# Patient Record
Sex: FEMALE | Race: WHITE | URBAN - METROPOLITAN AREA
[De-identification: names, ages, dates, MRNs, and addresses within clinical notes are randomized per-mention and may not be internally consistent; named-entity substitution may affect disease eponyms.]

---

## 2017-12-08 ENCOUNTER — OFFICE VISIT (OUTPATIENT)
Dept: FAMILY MEDICINE | Facility: CLINIC | Age: 32
End: 2017-12-08
Payer: COMMERCIAL

## 2017-12-08 VITALS
WEIGHT: 158 LBS | DIASTOLIC BLOOD PRESSURE: 83 MMHG | BODY MASS INDEX: 26.98 KG/M2 | OXYGEN SATURATION: 97 % | SYSTOLIC BLOOD PRESSURE: 125 MMHG | HEIGHT: 64 IN | TEMPERATURE: 98.8 F | HEART RATE: 77 BPM

## 2017-12-08 DIAGNOSIS — Z01.818 PRE-OP EXAM: Primary | ICD-10-CM

## 2017-12-08 PROCEDURE — 99202 OFFICE O/P NEW SF 15 MIN: CPT | Mod: 25 | Performed by: NURSE PRACTITIONER

## 2017-12-08 PROCEDURE — 93000 ELECTROCARDIOGRAM COMPLETE: CPT | Performed by: NURSE PRACTITIONER

## 2017-12-08 RX ORDER — INSULIN GLARGINE 100 [IU]/ML
INJECTION, SOLUTION SUBCUTANEOUS
COMMUNITY
Start: 2015-08-10

## 2017-12-08 RX ORDER — LANCETS
1 EACH MISCELLANEOUS
COMMUNITY
Start: 2015-07-20 | End: 2016-07-19

## 2017-12-08 NOTE — PROGRESS NOTES
"HPI      SUBJECTIVE:   Payal Henson is a 32 year old female who presents to clinic today for the following health issues:      Chief Complaint   Patient presents with     Other     EKG       Needs ekg for preop   Getting a breast reduction  Had preop completed at ob/gyn office   No cardiac history or family hx  No symptoms       Past Medical History:   Diagnosis Date     Depressive disorder     on medication     Diabetes (H)     type I on insulin pump     Past Surgical History:   Procedure Laterality Date      SECTION N/A 10/3/2015    Procedure:  SECTION;  Surgeon: Guanakito Mendez MD;  Location:  [facility]     COSMETIC SURGERY      birthmark on upper lip     Social History   Substance Use Topics     Smoking status: Never Smoker     Smokeless tobacco: Never Used     Alcohol use No     Current Outpatient Prescriptions   Medication Sig Dispense Refill     insulin aspart (NOVOLOG VIAL) 100 UNITS/ML injection Inject 40 Units Subcutaneous        blood glucose monitoring (VARGHESE CONTOUR NEXT) test strip 6 strips       insulin glargine (LANTUS) 100 UNIT/ML injection 38-40 units daily if pump failure  Indications: DIABETES MELLITUS       CITALOPRAM HYDROBROMIDE PO Take 20 mg by mouth       Allergies   Allergen Reactions     No Known Allergies        Reviewed and updated as needed this visit by clinical staff and provider      ROS  Detailed as above     /83 (BP Location: Left arm, Cuff Size: Adult Regular)  Pulse 77  Temp 98.8  F (37.1  C) (Oral)  Ht 5' 4\" (1.626 m)  Wt 158 lb (71.7 kg)  LMP 2017 (Exact Date)  SpO2 97%  BMI 27.12 kg/m2      Physical Exam   Constitutional: She is well-developed, well-nourished, and in no distress.   Pulmonary/Chest: Effort normal.   Neurological: She is alert.   Psychiatric: Mood and affect normal.   Vitals reviewed.      Assessment and Plan:       ICD-10-CM    1. Pre-op exam Z01.818 insulin aspart (NOVOLOG VIAL) 100 UNITS/ML injection     blood glucose " monitoring (VARGHESE CONTOUR NEXT) test strip     blood glucose monitoring (ONE TOUCH ULTRASOFT) lancets     insulin glargine (LANTUS) 100 UNIT/ML injection     EKG 12-lead complete w/read - Clinics       ekg without acute findings   Will fax to provider who completed preop       Viridiana Cuevas APRN, CNP  [facility]

## 2017-12-08 NOTE — NURSING NOTE
"Chief Complaint   Patient presents with     Other     EKG       Initial /83 (BP Location: Left arm, Cuff Size: Adult Regular)  Pulse 77  Temp 98.8  F (37.1  C) (Oral)  Ht 5' 4\" (1.626 m)  Wt 158 lb (71.7 kg)  LMP 12/02/2017 (Exact Date)  SpO2 97%  BMI 27.12 kg/m2 Estimated body mass index is 27.12 kg/(m^2) as calculated from the following:    Height as of this encounter: 5' 4\" (1.626 m).    Weight as of this encounter: 158 lb (71.7 kg).  Medication Reconciliation: complete     Rosa Alcaraz CMA      "

## 2017-12-08 NOTE — MR AVS SNAPSHOT
"              After Visit Summary   2017    Payal Henson    MRN: [id number]           Patient Information     Date Of Birth          1985        Visit Information        Provider Department      2017 10:45 AM Viridiana Cuevas APRN CNP [facility]        Today's Diagnoses     Pre-op exam    -  1       Follow-ups after your visit        Who to contact     If you have questions or need follow up information about today's clinic visit or your schedule please contact [facility] directly at [phone].  Normal or non-critical lab and imaging results will be communicated to you by [employer], letter or phone within 4 business days after the clinic has received the results. If you do not hear from us within 7 days, please contact the clinic through [employer] or phone. If you have a critical or abnormal lab result, we will notify you by phone as soon as possible.  Submit refill requests through "[employer]" or call your pharmacy and they will forward the refill request to us. Please allow 3 business days for your refill to be completed.          Additional Information About Your Visit        MyChart Information     "[employer] lets you send messages to your doctor, view your test results, renew your prescriptions, schedule appointments and more. To sign up, go to [email or web address]" . Click on \"Log in\" on the left side of the screen, which will take you to the Welcome page. Then click on \"Sign up Now\" on the right side of the page.     You will be asked to enter the access code listed below, as well as some personal information. Please follow the directions to create your username and password.     Your access code is: [email or web address]  Expires: 3/13/2018 12:26 PM     Your access code will  in 90 days. If you need help or a new code, please call your [facility] or [phone].        Care EveryWhere ID     This is your Care EveryWhere ID. This could be used by other organizations " "to access your [facility] medical records  [id number]        Your Vitals Were     Pulse Temperature Height Last Period Pulse Oximetry BMI (Body Mass Index)    77 98.8  F (37.1  C) (Oral) 5' 4\" (1.626 m) 12/02/2017 (Exact Date) 97% 27.12 kg/m2       Blood Pressure from Last 3 Encounters:   12/08/17 125/83   10/07/15 136/88   10/01/15 131/78    Weight from Last 3 Encounters:   12/08/17 158 lb (71.7 kg)   10/07/15 189 lb 4.8 oz (85.9 kg)   10/01/15 196 lb 4 oz (89 kg)              We Performed the Following     EKG 12-lead complete w/read - Clinics          Today's Medication Changes          These changes are accurate as of: 12/8/17 11:59 PM.  If you have any questions, ask your nurse or doctor.               Stop taking these medicines if you haven't already. Please contact your care team if you have questions.     hydrALAZINE 10 MG tablet   Commonly known as:  APRESOLINE   Stopped by:  Viridiana Cuevas APRN CNP           ibuprofen 400 MG tablet   Commonly known as:  ADVIL/MOTRIN   Stopped by:  Viridiana Cuevas APRN CNP           oxyCODONE-acetaminophen 5-325 MG per tablet   Commonly known as:  PERCOCET   Stopped by:  Viridiana Cuevas APRN CNP           prenatal multivitamin plus iron 27-0.8 MG Tabs per tablet   Stopped by:  Viridiana Cuevas APRN CNP           senna-docusate 8.6-50 MG per tablet   Commonly known as:  SENOKOT-S;PERICOLACE   Stopped by:  Viridiana Cuevas APRN CNP                    Primary Care Provider Fax #    [facility] [phone] [address] [place] [id number]        Equal Access to Services     SONNY DYSON : Dagmar Hanks, waerick lusteven, qaybta kaalmajohnie marquis, stevie hendrix. So [facility] [phone].    ATENCIÓN: Si habla español, tiene a garner disposición servicios gratuitos de asistencia lingüística. Soto al [phone].    We comply with applicable federal civil rights laws and [place] " laws. We do not discriminate on the basis of race, color, national origin, age, disability, sex, sexual orientation, or gender identity.            Thank you!     Thank you for choosing [facility]  for your care. Our goal is always to provide you with excellent care. Hearing back from our patients is one way we can continue to improve our services. Please take a few minutes to complete the written survey that you may receive in the mail after your visit with us. Thank you!             Your Updated Medication List - Protect others around you: Learn how to safely use, store and throw away your medicines at www.disposemymeds.org.          This list is accurate as of: 12/8/17 11:59 PM.  Always use your most recent med list.                   Brand Name Dispense Instructions for use Diagnosis    VARGHESE CONTOUR NEXT test strip   Generic drug:  blood glucose monitoring      6 strips    Pre-op exam       CITALOPRAM HYDROBROMIDE PO      Take 20 mg by mouth        insulin glargine 100 UNIT/ML injection    LANTUS     38-40 units daily if pump failure  Indications: DIABETES MELLITUS    Pre-op exam       NovoLOG VIAL 100 UNITS/ML injection   Generic drug:  insulin aspart      Inject 40 Units Subcutaneous    Pre-op exam

## 2017-12-12 ENCOUNTER — TELEPHONE (OUTPATIENT)
Dept: FAMILY MEDICINE | Facility: CLINIC | Age: 32
End: 2017-12-12

## 2017-12-12 NOTE — TELEPHONE ENCOUNTER
Reason for Call:  Other     Detailed comments: The surgery center called because they need the tracing for the patients EKG  Please fax it to [phone]    Phone Number Patient can be reached at: Na    Best Time: anytime    Can we leave a detailed message on this number? NO    Call taken on 12/12/2017 at 1:01 PM by Tati Burdick